# Patient Record
Sex: MALE | Race: BLACK OR AFRICAN AMERICAN | NOT HISPANIC OR LATINO | URBAN - METROPOLITAN AREA
[De-identification: names, ages, dates, MRNs, and addresses within clinical notes are randomized per-mention and may not be internally consistent; named-entity substitution may affect disease eponyms.]

---

## 2024-11-30 ENCOUNTER — HOSPITAL ENCOUNTER (EMERGENCY)
Facility: HOSPITAL | Age: 34
Discharge: HOME | End: 2024-11-30
Payer: OTHER GOVERNMENT

## 2024-11-30 ENCOUNTER — APPOINTMENT (OUTPATIENT)
Dept: RADIOLOGY | Facility: HOSPITAL | Age: 34
End: 2024-11-30
Payer: OTHER GOVERNMENT

## 2024-11-30 VITALS
HEART RATE: 63 BPM | OXYGEN SATURATION: 99 % | SYSTOLIC BLOOD PRESSURE: 147 MMHG | HEIGHT: 71 IN | WEIGHT: 175 LBS | TEMPERATURE: 97.5 F | DIASTOLIC BLOOD PRESSURE: 94 MMHG | BODY MASS INDEX: 24.5 KG/M2 | RESPIRATION RATE: 16 BRPM

## 2024-11-30 DIAGNOSIS — K04.7 DENTAL INFECTION: Primary | ICD-10-CM

## 2024-11-30 DIAGNOSIS — S02.5XXA CLOSED FRACTURE OF TOOTH, INITIAL ENCOUNTER: ICD-10-CM

## 2024-11-30 DIAGNOSIS — S02.609A CLOSED FRACTURE OF MANDIBLE, UNSPECIFIED LATERALITY, UNSPECIFIED MANDIBULAR SITE, INITIAL ENCOUNTER (MULTI): ICD-10-CM

## 2024-11-30 PROCEDURE — 2500000001 HC RX 250 WO HCPCS SELF ADMINISTERED DRUGS (ALT 637 FOR MEDICARE OP): Performed by: NURSE PRACTITIONER

## 2024-11-30 PROCEDURE — 70486 CT MAXILLOFACIAL W/O DYE: CPT | Performed by: RADIOLOGY

## 2024-11-30 PROCEDURE — 99284 EMERGENCY DEPT VISIT MOD MDM: CPT | Mod: 25

## 2024-11-30 PROCEDURE — 2500000002 HC RX 250 W HCPCS SELF ADMINISTERED DRUGS (ALT 637 FOR MEDICARE OP, ALT 636 FOR OP/ED): Performed by: NURSE PRACTITIONER

## 2024-11-30 PROCEDURE — 76377 3D RENDER W/INTRP POSTPROCES: CPT

## 2024-11-30 PROCEDURE — 70486 CT MAXILLOFACIAL W/O DYE: CPT

## 2024-11-30 PROCEDURE — 76377 3D RENDER W/INTRP POSTPROCES: CPT | Performed by: RADIOLOGY

## 2024-11-30 RX ORDER — ACETAMINOPHEN 500 MG
1000 TABLET ORAL 2 TIMES DAILY
Qty: 28 TABLET | Refills: 0 | Status: SHIPPED | OUTPATIENT
Start: 2024-11-30 | End: 2024-12-07

## 2024-11-30 RX ORDER — PENICILLIN V POTASSIUM 250 MG/1
500 TABLET, FILM COATED ORAL ONCE
Status: COMPLETED | OUTPATIENT
Start: 2024-11-30 | End: 2024-11-30

## 2024-11-30 RX ORDER — NAPROXEN 500 MG/1
500 TABLET ORAL
Qty: 20 TABLET | Refills: 0 | Status: SHIPPED | OUTPATIENT
Start: 2024-11-30 | End: 2024-12-10

## 2024-11-30 RX ORDER — PENICILLIN V POTASSIUM 500 MG/1
500 TABLET, FILM COATED ORAL 4 TIMES DAILY
Qty: 28 TABLET | Refills: 0 | Status: SHIPPED | OUTPATIENT
Start: 2024-11-30 | End: 2024-12-07

## 2024-11-30 RX ORDER — NAPROXEN 500 MG/1
500 TABLET ORAL ONCE
Status: COMPLETED | OUTPATIENT
Start: 2024-11-30 | End: 2024-11-30

## 2024-11-30 RX ADMIN — NAPROXEN 500 MG: 500 TABLET ORAL at 18:32

## 2024-11-30 RX ADMIN — PENICILLIN V POTASSIUM 500 MG: 250 TABLET, FILM COATED ORAL at 18:31

## 2024-11-30 ASSESSMENT — COLUMBIA-SUICIDE SEVERITY RATING SCALE - C-SSRS
1. IN THE PAST MONTH, HAVE YOU WISHED YOU WERE DEAD OR WISHED YOU COULD GO TO SLEEP AND NOT WAKE UP?: NO
6. HAVE YOU EVER DONE ANYTHING, STARTED TO DO ANYTHING, OR PREPARED TO DO ANYTHING TO END YOUR LIFE?: NO
2. HAVE YOU ACTUALLY HAD ANY THOUGHTS OF KILLING YOURSELF?: NO

## 2024-11-30 ASSESSMENT — PAIN SCALES - GENERAL
PAINLEVEL_OUTOF10: 0 - NO PAIN
PAINLEVEL_OUTOF10: 7

## 2024-11-30 ASSESSMENT — PAIN DESCRIPTION - ORIENTATION: ORIENTATION: RIGHT

## 2024-11-30 ASSESSMENT — PAIN - FUNCTIONAL ASSESSMENT: PAIN_FUNCTIONAL_ASSESSMENT: 0-10

## 2024-11-30 ASSESSMENT — PAIN DESCRIPTION - LOCATION: LOCATION: JAW

## 2024-11-30 NOTE — ED PROVIDER NOTES
HPI   Chief Complaint   Patient presents with    Jaw Pain       34-year-old male presents today with concern for right mandible fracture.  He is brought in from half-way.  He is denying any sort of punched to the face or injury to the face.  He is denying any recent fall.  He does have a history of dental caries and dental infection.  He was told by the nurse at the half-way that he has not only had dental infection but a fracture of the jaw.  He has full range of motion of the jaw and he is able to communicate without any difficulty.  There is no airway involvement or stridor.  Remaining complaints are negative.  Patient is asking for some Naprosyn for his pain.  He has no allergies to antibiotics.      History provided by:  Patient and police   used: No            Patient History   No past medical history on file.  No past surgical history on file.  No family history on file.  Social History     Tobacco Use    Smoking status: Not on file    Smokeless tobacco: Not on file   Substance Use Topics    Alcohol use: Not on file    Drug use: Not on file       Physical Exam   ED Triage Vitals [11/30/24 1802]   Temperature Heart Rate Respirations BP   36.4 °C (97.5 °F) 72 18 (!) 156/109      Pulse Ox Temp Source Heart Rate Source Patient Position   99 % Temporal -- --      BP Location FiO2 (%)     -- --       Physical Exam  HENT:      Head: Normocephalic.      Comments: Right lower mandible slightly swollen at the jawline     Right Ear: Tympanic membrane normal.      Left Ear: Tympanic membrane normal.      Nose: Nose normal.      Mouth/Throat:      Mouth: Mucous membranes are moist.      Comments: Dental caries and dental fracture appreciated during inspection  Eyes:      Extraocular Movements: Extraocular movements intact.      Pupils: Pupils are equal, round, and reactive to light.   Cardiovascular:      Rate and Rhythm: Normal rate and regular rhythm.      Pulses: Normal pulses.      Heart sounds: Normal  heart sounds.   Pulmonary:      Effort: Pulmonary effort is normal.      Breath sounds: Normal breath sounds.   Abdominal:      General: Abdomen is flat.      Palpations: Abdomen is soft.   Musculoskeletal:         General: Normal range of motion.      Cervical back: Normal range of motion and neck supple.   Skin:     General: Skin is warm.      Capillary Refill: Capillary refill takes less than 2 seconds.   Neurological:      General: No focal deficit present.      Mental Status: He is alert and oriented to person, place, and time.   Psychiatric:         Mood and Affect: Mood normal.         Behavior: Behavior normal.           ED Course & MDM   Diagnoses as of 11/30/24 1952   Dental infection   Closed fracture of tooth, initial encounter   Closed fracture of mandible, unspecified laterality, unspecified mandibular site, initial encounter (Multi)                 No data recorded     Pilgrim Coma Scale Score: 15 (11/30/24 1802 : Yahaira Brink RN)                           Medical Decision Making  I was concerned about a occult fracture of the lower right mandible and a CT of the maxillofacial was ordered.  Patient received penicillin for dental infection.  He received Naprosyn for pain.  CT maxillofacial confirmed the fracture but it did not appear to be displaced.  I contacted Dr. 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  He will go on penicillin 4 times daily for dental infection.  He will use Naprosyn and acetaminophen for pain.  There was no airway involvement at that time.  Patient was safely discharged home with careful return precautions.    Amount and/or Complexity of Data Reviewed  Radiology: ordered and independent interpretation performed.     Details: X-ray reviewed by oral surgery Dr. Robert Nunn.  It is a nondisplaced fracture of the mandible.          Procedure  Procedures     Jasvir Li, APRN-CNP  11/30/24 1953

## 2024-11-30 NOTE — ED TRIAGE NOTES
Pt presents to the ED from [facility] with c/o jaw fracture. Pt has a filling that fell out and went to the dentist to get it fixed and on the xray was told that he has a jaw fracture. Pt states it started to hurt today. Pt unsure of how the jaw broke, denies injury or trauma to the area, but does endorse pain.

## 2025-06-15 ENCOUNTER — HOSPITAL ENCOUNTER (EMERGENCY)
Facility: HOSPITAL | Age: 35
Discharge: LEFT BEFORE DISCHARGE | End: 2025-06-15
Payer: COMMERCIAL

## 2025-06-15 VITALS
RESPIRATION RATE: 18 BRPM | HEART RATE: 78 BPM | HEIGHT: 72 IN | TEMPERATURE: 98.1 F | BODY MASS INDEX: 23.7 KG/M2 | SYSTOLIC BLOOD PRESSURE: 167 MMHG | OXYGEN SATURATION: 100 % | DIASTOLIC BLOOD PRESSURE: 104 MMHG | WEIGHT: 175 LBS

## 2025-06-15 PROCEDURE — 99283 EMERGENCY DEPT VISIT LOW MDM: CPT | Performed by: EMERGENCY MEDICINE

## 2025-06-15 PROCEDURE — 99281 EMR DPT VST MAYX REQ PHY/QHP: CPT

## 2025-06-15 ASSESSMENT — LIFESTYLE VARIABLES
EVER FELT BAD OR GUILTY ABOUT YOUR DRINKING: NO
TOTAL SCORE: 0
EVER HAD A DRINK FIRST THING IN THE MORNING TO STEADY YOUR NERVES TO GET RID OF A HANGOVER: NO
HAVE YOU EVER FELT YOU SHOULD CUT DOWN ON YOUR DRINKING: NO
HAVE PEOPLE ANNOYED YOU BY CRITICIZING YOUR DRINKING: NO

## 2025-06-15 ASSESSMENT — PAIN - FUNCTIONAL ASSESSMENT: PAIN_FUNCTIONAL_ASSESSMENT: 0-10

## 2025-06-15 ASSESSMENT — PAIN SCALES - GENERAL: PAINLEVEL_OUTOF10: 0 - NO PAIN

## 2025-06-15 NOTE — ED TRIAGE NOTES
Pt presents to the ED reporting that he fell asleep on the rapid and missed his stop, he believes he is dehydrated

## 2025-06-15 NOTE — ED PROVIDER NOTES
"History of Present Illness     History provided by: Patient  Limitations to History: None  External Records Reviewed with Brief Summary: EMR    HPI:  Tenzin Reardon is a 34 y.o. male who presents by EMS after patient fell asleep at a bus stop, stating he is dehydrated.  Patient denies headache, syncope, change in vision, chest pain, shortness of breath, neck or back pain, abdominal pain, fevers or chills.  States he is dehydrated, offered IV rehydration, and accepted.  However shortly thereafter, patient stood up and said, \"I am going to leave now\".  Offered ginger ale and redirection, politely declined, walked out of ED.    Physical Exam   Triage vitals:  T 36.7 °C (98.1 °F)  HR 78  BP (!) 167/104  RR 18  O2 100 % None (Room air)    General: Awake, alert, in no acute distress, malodorous  Eyes: Gaze conjugate.  No scleral icterus or injection  HENT: Normo-cephalic, atraumatic. No stridor  CV: Regular rate, regular rhythm. Radial pulses 2+ bilaterally  Resp: Breathing non-labored, speaking in full sentences.  Clear to auscultation bilaterally  GI: Soft, non-distended, non-tender. No rebound or guarding.  MSK/Extremities: No gross bony deformities. Moving all extremities  Skin: Warm. Appropriate color  Neuro: Alert. Oriented. Face symmetric. Speech is fluent.  Gross strength and sensation intact in b/l UE and LE.  Speaking in full sentences, AAO x 4, decision-making capacity intact  Psych: Appropriate mood and affect    Medical Decision Making & ED Course   Medical Decision Makin y.o. male here by EMS after being found sleeping at bus stop.  Stating he is dehydrated, offered IV rehydration, initially excepted, then stood up and stated she would like to leave.  Offered ginger ale, declined, seen ambulating out of ED with steady gait.  Prior to that, patient AAO x 4, with clear decision made capacity.  ----    Differential diagnoses considered include but are not limited to: Dehydration     Social Determinants " of Health which Significantly Impact Care: Transportation difficulties     EKG Independent Interpretation: EKG not obtained    Independent Result Review and Interpretation: Relevant laboratory and radiographic results were reviewed and independently interpreted by myself.  As necessary, they are commented on in the ED Course.    Chronic conditions affecting the patient's care: As documented above in MDM    The patient was discussed with the following consultants/services: None    Care Considerations: As documented above in MDM    ED Course:     Disposition   Left with Treatment Incomplete    Procedures   Procedures    Patient was seen independently    Tj Swenson DO  Emergency Medicine       Tj Swenson DO  Resident  06/15/25 0233

## 2025-06-16 ENCOUNTER — HOSPITAL ENCOUNTER (EMERGENCY)
Facility: HOSPITAL | Age: 35
Discharge: LEFT BEFORE DISCHARGE | End: 2025-06-17
Payer: COMMERCIAL

## 2025-06-16 VITALS
SYSTOLIC BLOOD PRESSURE: 125 MMHG | HEART RATE: 87 BPM | OXYGEN SATURATION: 97 % | HEIGHT: 72 IN | BODY MASS INDEX: 23.7 KG/M2 | TEMPERATURE: 97.9 F | WEIGHT: 175 LBS | RESPIRATION RATE: 18 BRPM | DIASTOLIC BLOOD PRESSURE: 78 MMHG

## 2025-06-16 PROCEDURE — 99281 EMR DPT VST MAYX REQ PHY/QHP: CPT

## 2025-06-16 PROCEDURE — 4500999001 HC ED NO CHARGE

## 2025-06-16 ASSESSMENT — PAIN - FUNCTIONAL ASSESSMENT: PAIN_FUNCTIONAL_ASSESSMENT: 0-10

## 2025-06-16 ASSESSMENT — PAIN SCALES - GENERAL: PAINLEVEL_OUTOF10: 7

## 2025-06-16 ASSESSMENT — PAIN DESCRIPTION - PAIN TYPE: TYPE: ACUTE PAIN

## 2025-06-16 ASSESSMENT — PAIN DESCRIPTION - LOCATION: LOCATION: FOOT
